# Patient Record
Sex: FEMALE | Race: WHITE | Employment: OTHER | URBAN - METROPOLITAN AREA
[De-identification: names, ages, dates, MRNs, and addresses within clinical notes are randomized per-mention and may not be internally consistent; named-entity substitution may affect disease eponyms.]

---

## 2017-10-06 ENCOUNTER — OFFICE VISIT (OUTPATIENT)
Dept: ENT CLINIC | Age: 72
End: 2017-10-06

## 2017-10-06 VITALS
SYSTOLIC BLOOD PRESSURE: 120 MMHG | WEIGHT: 155 LBS | HEIGHT: 62 IN | BODY MASS INDEX: 28.52 KG/M2 | DIASTOLIC BLOOD PRESSURE: 72 MMHG | HEART RATE: 108 BPM

## 2017-10-06 DIAGNOSIS — H61.23 EXCESSIVE CERUMEN IN BOTH EAR CANALS: Primary | ICD-10-CM

## 2017-10-06 PROCEDURE — 69210 REMOVE IMPACTED EAR WAX UNI: CPT | Performed by: OTOLARYNGOLOGY

## 2017-10-06 NOTE — MR AVS SNAPSHOT
After Visit Summary             Lyndon Lind   10/6/2017 4:15 PM   Office Visit    Description:  Female : 1945   Provider:  Chris Robert MD   Department:  [facility]              Your Follow-Up and Future Appointments         Below is a list of your follow-up and future appointments. This may not be a complete list as you may have made appointments directly with providers that we are not aware of or your providers may have made some for you. Please call your providers to confirm appointments. It is important to keep your appointments. Please bring your current insurance card, photo ID, co-pay, and all medication bottles to your appointment. If self-pay, payment is expected at the time of service. Your To-Do List     Follow-Up    Return in about 1 year (around 10/6/2018). Information from Your Visit        Department     Name Address Phone Fax    [address] [address] [place] [address], [address] [place] [phone]      You Were Seen for:         Comments    Excessive cerumen in both ear canals   [[id number]]         Vital Signs     Blood Pressure Pulse Height Weight Breastfeeding? Body Mass Index    120/72 108 5' 2\" (1.575 m) 155 lb (70.3 kg) No 28.35 kg/m2    Smoking Status                   Former Smoker           Additional Information about your Body Mass Index (BMI)           Your BMI as listed above is considered overweight (25.0-29.9). BMI is an estimate of body fat, calculated from your height and weight. The higher your BMI, the greater your risk of heart disease, high blood pressure, type 2 diabetes, stroke, gallstones, arthritis, sleep apnea, and certain cancers. BMI is not perfect. It may overestimate body fat in athletes and people who are more muscular. If your body fat is high you can improve your BMI by decreasing your calorie intake and becoming more physically active. Learn more at: [employer]             Medications and Orders      Your Current Medications Are              levothyroxine (SYNTHROID) 100 MCG tablet Take 100 mcg by mouth daily. esomeprazole (NEXIUM) 40 MG capsule Take 40 mg by mouth every morning (before breakfast). aspirin 81 MG EC tablet Take 81 mg by mouth daily. amLODIPine (NORVASC) 5 MG tablet Take 5 mg by mouth daily. paliperidone (INVEGA) 3 MG ER tablet Take 6 mg by mouth every morning. benztropine (COGENTIN) 0.5 MG tablet Take 0.5 mg by mouth 2 times daily. QUEtiapine fumarate (SEROQUEL XR) 150 MG TB24 Take 150 mg by mouth daily. Allergies           No Known Allergies         Additional Information        Basic Information     Date Of Birth Sex Race Ethnicity Preferred Language    1945 Female White Non-/Non  English      Problem List as of 10/6/2017                 Excessive cerumen in both ear canals    Excessive cerumen in right ear canal    Otitis externa of right ear      Immunizations as of 10/6/2017     Name Date    Tdap (Boostrix, Adacel) 7/21/2012      Preventive Care        Date Due    Hepatitis C screening is recommended for all adults regardless of risk factors born between [facility] at least once (lifetime) who have never been tested. 1945    Cholesterol Screening 10/28/1986    Diabetes Screening 10/28/1986    Mammograms are recommended every 2 years for low/average risk patients aged 48 - 69, and every year for high risk patients per updated national guidelines. However these guidelines can be individualized by your provider. 10/28/1996    Colonoscopy 10/28/1996    Zoster Vaccine 10/28/2006    Osteoporosis screening or a bone density scan (Dexa) is recommended once at age 72. Based upon the results and risk factors for bone loss, your provider will recommend whether this needs to be repeated.  10/28/2011

## 2021-11-04 ENCOUNTER — OFFICE VISIT (OUTPATIENT)
Dept: ENT CLINIC | Age: 76
End: 2021-11-04
Payer: OTHER GOVERNMENT

## 2021-11-04 VITALS — SYSTOLIC BLOOD PRESSURE: 158 MMHG | DIASTOLIC BLOOD PRESSURE: 84 MMHG | HEART RATE: 93 BPM | TEMPERATURE: 97.3 F

## 2021-11-04 DIAGNOSIS — H61.23 BILATERAL IMPACTED CERUMEN: Primary | ICD-10-CM

## 2021-11-04 DIAGNOSIS — J30.0 VASOMOTOR RHINITIS: ICD-10-CM

## 2021-11-04 PROCEDURE — 4040F PNEUMOC VAC/ADMIN/RCVD: CPT | Performed by: STUDENT IN AN ORGANIZED HEALTH CARE EDUCATION/TRAINING PROGRAM

## 2021-11-04 PROCEDURE — 69210 REMOVE IMPACTED EAR WAX UNI: CPT | Performed by: STUDENT IN AN ORGANIZED HEALTH CARE EDUCATION/TRAINING PROGRAM

## 2021-11-04 PROCEDURE — 1036F TOBACCO NON-USER: CPT | Performed by: STUDENT IN AN ORGANIZED HEALTH CARE EDUCATION/TRAINING PROGRAM

## 2021-11-04 PROCEDURE — 1090F PRES/ABSN URINE INCON ASSESS: CPT | Performed by: STUDENT IN AN ORGANIZED HEALTH CARE EDUCATION/TRAINING PROGRAM

## 2021-11-04 PROCEDURE — 99203 OFFICE O/P NEW LOW 30 MIN: CPT | Performed by: STUDENT IN AN ORGANIZED HEALTH CARE EDUCATION/TRAINING PROGRAM

## 2021-11-04 PROCEDURE — 3017F COLORECTAL CA SCREEN DOC REV: CPT | Performed by: STUDENT IN AN ORGANIZED HEALTH CARE EDUCATION/TRAINING PROGRAM

## 2021-11-04 PROCEDURE — G8421 BMI NOT CALCULATED: HCPCS | Performed by: STUDENT IN AN ORGANIZED HEALTH CARE EDUCATION/TRAINING PROGRAM

## 2021-11-04 PROCEDURE — G8427 DOCREV CUR MEDS BY ELIG CLIN: HCPCS | Performed by: STUDENT IN AN ORGANIZED HEALTH CARE EDUCATION/TRAINING PROGRAM

## 2021-11-04 PROCEDURE — G8400 PT W/DXA NO RESULTS DOC: HCPCS | Performed by: STUDENT IN AN ORGANIZED HEALTH CARE EDUCATION/TRAINING PROGRAM

## 2021-11-04 PROCEDURE — 1123F ACP DISCUSS/DSCN MKR DOCD: CPT | Performed by: STUDENT IN AN ORGANIZED HEALTH CARE EDUCATION/TRAINING PROGRAM

## 2021-11-04 PROCEDURE — G8484 FLU IMMUNIZE NO ADMIN: HCPCS | Performed by: STUDENT IN AN ORGANIZED HEALTH CARE EDUCATION/TRAINING PROGRAM

## 2021-11-04 RX ORDER — IPRATROPIUM BROMIDE 42 UG/1
2 SPRAY, METERED NASAL 3 TIMES DAILY PRN
Qty: 1 EACH | Refills: 3 | Status: SHIPPED | OUTPATIENT
Start: 2021-11-04

## 2021-11-04 NOTE — PROGRESS NOTES
[facility]  NEW PATIENT HISTORY AND PHYSICAL NOTE      Patient Name: Sharyne Lesches  Medical Record Number:  [id number]  Primary Care Physician:  Brea Luo    ChiefComplaint     Chief Complaint   Patient presents with    Hearing Problem       History of Present Illness     Sharyne Lesches is an 77 y.o. female presenting with hearing loss. Seen by audiology initially but was noted to have bilateral impacted cerumen. History of bilateral impacted cerumen in the past. No family history of early onset hearing loss. No history of ear surgery. Denies otalgia or otorrhea. Denies tinnitus. After cerumen debridement she states that her hearing is 100% better and did not feel the need for hearing test.    Denies itchy/watery eyes and nasal congestion but states that he often gets a watery runny nose. Past Medical History     Past Medical History:   Diagnosis Date    CAD (coronary artery disease)     Hypertension     Psychosomatic seizure 66    Thyroid disease        Past Surgical History     Past Surgical History:   Procedure Laterality Date    ABDOMEN SURGERY      FRACTURE SURGERY         Family History     History reviewed. No pertinent family history. Social History     Social History     Tobacco Use    Smoking status: Former Smoker     Packs/day: 0.50     Types: Cigarettes    Smokeless tobacco: Never Used   Substance Use Topics    Alcohol use: No    Drug use: No        Allergies     No Known Allergies    Medications     Current Outpatient Medications   Medication Sig Dispense Refill    ipratropium (ATROVENT) 0.06 % nasal spray 2 sprays by Each Nostril route 3 times daily as needed for Rhinitis (runny nose) 1 each 3    levothyroxine (SYNTHROID) 100 MCG tablet Take 100 mcg by mouth daily.  esomeprazole (NEXIUM) 40 MG capsule Take 40 mg by mouth every morning (before breakfast).         aspirin 81 MG EC tablet Take 81 mg by mouth daily.        amLODIPine (NORVASC) 5 MG tablet Take 5 mg by mouth daily.  paliperidone (INVEGA) 3 MG ER tablet Take 6 mg by mouth every morning.  benztropine (COGENTIN) 0.5 MG tablet Take 0.5 mg by mouth 2 times daily.  QUEtiapine fumarate (SEROQUEL XR) 150 MG TB24 Take 150 mg by mouth daily. No current facility-administered medications for this visit. Review of Systems     REVIEW OF SYSTEMS  The following systems were reviewed and revealed the following in addition to any already discussed in the HPI:    CONSTITUTIONAL: no weight loss, no fever, no night sweats, no chills  EYES: no vision changes, no blurry vision  EARS: no hearing loss, no otalgia  NOSE: no epistaxis, no rhinorrhea  THROAT: No voice changes, no sore throat, no dysphagia      PhysicalExam     Vitals:    11/04/21 1454   BP: (!) 158/84   Site: Left Upper Arm   Position: Sitting   Cuff Size: Medium Adult   Pulse: 93   Temp: 97.3 °F (36.3 °C)   TempSrc: Temporal       PHYSICAL EXAM  BP (!) 158/84 (Site: Left Upper Arm, Position: Sitting, Cuff Size: Medium Adult)   Pulse 93   Temp 97.3 °F (36.3 °C) (Temporal)     GENERAL: No acute distress, alert and oriented, no hoarseness  EYES: EOMI, Anti-icteric  NOSE: On anterior rhinoscopy there is no epistaxis, nasal mucosa moist and normal appearing, no purulent drainage. EARS: Normal external appearance; on portable otomicroscopy there is cerumen faction noted bilaterally, see procedure note below.   Pneumatic otoscopy: Bilateral tympanic membranes mobile pneumatic otoscopy  FACE: HB 1/6 bilaterally, symmetric appearing, sensation equal bilaterally  ORAL CAVITY: No masses or lesions visualized or palpated, uvula is midline, moist mucous membranes, absent tonsils, missing multiple teeth with remaining dentition in decay  NECK: Normal range of motion, no thyromegaly, trachea is midline, no palpable lymphadenopathy or neck masses, no crepitus  NEURO: Cranial Nerves 2, 3, 4, 5, 6, 7, 11, 12 grossly intact bilaterally     I have performed a head and neck physical exam personally or was physically present during the key or critical portions of the service. Procedure     Procedure: Binocular otomicroscopy with debridement of cerumen impaction    Pre-op: Cerumen impaction of the bilateral external auditory canals. Post op: Same  Procedure : Binocular otomicroscopy with debridement of cerumen of bilateral external auditory canals  Surgeon: Dr. North Montes De Oca DO  Estimated Blood Loss: None    Description of Procedure:    After obtaining verbal consent, the patient was placed in the examination chair in the reclined position.      -Right ear: External auditory canal with occluding cerumen limiting visualization of the tympanic membrane which was removed with use of #7 and #5 french suction, alligator forceps, and cerumen loop curet. After successful removal of cerumen, the right tympanic membrane was visualized and without significant retractions or cholesteatoma, no middle ear effusions.   -Left ear: External auditory canal with occluding cerumen limiting visualization of the tympanic membrane which was removed with use of #7 and #5 french suction, alligator forceps, and cerumen loop curet. After successful removal of cerumen, the left tympanic membrane was visualized and without significant retractions or cholesteatoma, no middle ear effusions. * Patient tolerated the procedure well with no complications    Assessment and Plan     1. Bilateral impacted cerumen  - Cerumen debrided in the office today  - Avoid Q-tip and self instrumentation of ears  - Hydrogen peroxide or Debrox (OTC) drops as needed or once every other week to help break up and soften wax  - Follow-up as needed for repeat debridement    2. Vasomotor rhinitis  -Start Atrovent nasal spray as needed    Follow Up     Return if symptoms worsen or fail to improve.         Dr. North Montes De Oca, [address]  Department of Otolaryngology/Head & Neck Surgery  11/4/21    Medical Decision Making: The following items were considered in medical decision making:  Independent review of images  Review / order clinical lab tests  Review / order radiology tests  Decision to obtain old records    This note was generated completely or in part utilizing Dragon dictation speech recognition software. Occasionally, words are mistranscribed and despite editing, the text may contain inaccuracies due to incorrect word recognition. If further clarification is needed please contact the office at [phone].